# Patient Record
(demographics unavailable — no encounter records)

---

## 2017-01-26 NOTE — ER DOCUMENT REPORT
ED General





- General


Chief Complaint: Blood Pressure Problem


Stated Complaint: BACK PAIN


Mode of Arrival: Ambulatory


Notes: 


Patient is a pleasant 46-year-old female who presents with complaint of back 

pain.  She says that she reached for something shelf.  She felt a pull in her 

mid back.  Since then she's had intermittent spasming of the muscles of her mid 

back.  No lower back pain.  No chest pain.  No abdominal pain.  No weakness or 

numbness into the extremities.  No other injuries or complaints.  No loss of 

control of bowel function.  No urinary retention.


TRAVEL OUTSIDE OF THE [place] IN LAST 30 DAYS: No





- Related Data


Allergies/Adverse Reactions: 


 





codeine [Codeine] Allergy (Severe, Verified 12/08/16 11:00)


 severe N&V











Past Medical History





- General


Information source: Patient





- Social History


Smoking Status: Never Smoker


Chew tobacco use (# tins/day): No


Frequency of alcohol use: None


Drug Abuse: None


Family History: Reviewed & Not Pertinent


Patient has suicidal ideation: No


Patient has homicidal ideation: No





- Past Medical History


Cardiac Medical History: 


   Denies: Hx Coronary Artery Disease, Hx Heart Attack, Hx Hypertension


Pulmonary Medical History: 


   Denies: Hx Asthma, Hx Bronchitis, Hx COPD, Hx Pneumonia


Neurological Medical History: Denies: Hx Cerebrovascular Accident, Hx Seizures


Renal/ Medical History: Denies: Hx Peritoneal Dialysis


GI Medical History: Reports: Hx Gastroesophageal Reflux Disease


Musculoskeltal Medical History: Denies Hx Arthritis


Psychiatric Medical History: Reports: Hx Anxiety


Infectious Medical History: 


Past Surgical History: Reports: Hx Appendectomy, Hx Hysterectomy, Hx Tubal 

Ligation.  Denies: Hx Pacemaker





- Immunizations


Immunizations up to date: No


Hx Diphtheria, Pertussis, Tetanus Vaccination: No





Review of Systems





- Review of Systems


Notes: 


My Normal Review Basic





REVIEW OF SYSTEMS:


CONSTITUTIONAL :  Denies fever,  chills, or sweats.  Denies recent illness.


RESPIRATORY:  Denies cough, cold, or chest congestion.  Denies shortness of 

breath, difficulty breathing, or wheezing.


GASTROINTESTINAL:  Denies abdominal pain.  Denies nausea, vomiting, or 

diarrhea.  Denies constipation.  Last BM: 


MUSCULOSKELETAL: Back pain


SKIN:   Denies rash or skin lesions.


NEUROLOGICAL:  Denies altered mental status or loss of consciousness.  Denies 

headache.  Denies weakness or paralysis or loss of use of either side.  Denies 

problems with gait or speech.  Denies sensory or motor loss.


ALL OTHER SYSTEMS REVIEWED AND NEGATIVE.





Physical Exam





- Vital signs


Vitals: 


 











Temp Pulse Resp BP Pulse Ox


 


 97.9 F   99   18   159/88 H  100 


 


 01/26/17 21:36  01/26/17 21:36  01/26/17 21:36  01/26/17 21:36  01/26/17 21:36














- Notes


Notes: 


General Appearance: Well nourished, alert, cooperative, no acute distress, no 

obvious discomfort.


Vitals: reviewed, See vital signs table.


Head: no swelling or tenderness to the head


Eyes: PERRL, EOMI, Conjuctiva clear


Mouth: No decreasd moisture


Back: No reproducible tenderness to palpation of the lumbar spine.  Patient has 

tenderness to palpation over the left thoracic paraspinal musculature around 

the level of T5-T6.


Extremities: strength 5/5 in all extremities, good pulses in all extremities, 

no swelling or tenderness in the extremities, no edema.


Skin: warm, dry, appropriate color, no rash


Neuro: speech clear, oriented x 3, normal affect, responds appropriately to 

questions.  Patient is able move all extremities without difficulty.  Normal 

gait.  Normal distal sensation.





Course





- Vital Signs


Vital signs: 


 











Temp Pulse Resp BP Pulse Ox


 


 97.7 F   93   20   150/92 H  99 


 


 01/27/17 00:25  01/27/17 00:25  01/27/17 00:25  01/27/17 00:25  01/27/17 00:25














- Transfer of Care


Notes: 





01/27/17 05:35


Patient's findings are consistent with that of the midthoracic spinal 

musculature spasm.  I will prescribe Skelaxin.  I encouraged return to ER 

immediately if she has worsening pain, fevers, or feels unwell.  Patient agrees 

with plan and will be discharged home.





Dictation of this chart was performed using voice recognition software; 

therefore, there may be some unintended grammatical errors.





Discharge





- Discharge


Clinical Impression: 


 Back spasm





Strain of thoracic region


Qualifiers:


 Encounter type: initial encounter Qualified Code(s): S29.019A - Strain of 

muscle and tendon of unspecified wall of thorax, initial encounter





Condition: Good


Disposition: HOME, SELF-CARE


Additional Instructions: 


Please return to the ER if you have worsening pain, chest pain, or feel unwell. 

Your blood pressure was slightly elevated today. This is probably related to 

pain. Please make sure that you follow up with your doctor for reevaluation to 

make sure your blood pressure is improving. Please be sure to continue to walk 

and stay active, but do not do heavy lifting or over exert yourself.


Prescriptions: 


Metaxalone [Skelaxin 800 mg Tablet] 800 mg PO ASDIR PRN #20 tablet


 PRN Reason: 


Referrals: 


MARIA INES MOORE MD [Primary Care Provider] - Follow up in 3-5 days

## 2017-01-26 NOTE — ER DOCUMENT REPORT
ED Medical Screen (RME)





- General


Stated Complaint: BACK PAIN


Mode of Arrival: Ambulatory


Information source: Patient


Notes: 


She presents to the emergency department with complaints of back spasm since 

Sunday.  She reports she was feeling bad today and took her blood pressure and 

it was 170/101.  Patient reports she took Aleve a couple days ago for her back 

pain.  Denies pain with void denies fever vomiting diarrhea.  Nuys history of 

cardiac disease denies chest pain.











I have greeted and performed a rapid initial assessment of this patient.  A 

comprehensive ED assessment and evaluation of the patient, analysis of test 

results and completion of the medical decision making process will be conducted 

by additional ED providers.














TRAVEL OUTSIDE OF THE [place] IN LAST 30 DAYS: No





- Related Data


Allergies/Adverse Reactions: 


 





codeine [Codeine] Allergy (Severe, Verified 12/08/16 11:00)


 severe N&V











Past Medical History





- Past Medical History


Cardiac Medical History: 


   Denies: Hx Coronary Artery Disease, Hx Heart Attack, Hx Hypertension


Pulmonary Medical History: 


   Denies: Hx Asthma, Hx Bronchitis, Hx COPD, Hx Pneumonia


Neurological Medical History: Denies: Hx Cerebrovascular Accident, Hx Seizures


GI Medical History: Reports: Hx Gastroesophageal Reflux Disease


Musculoskeltal Medical History: Denies Hx Arthritis


Psychiatric Medical History: Reports: Hx Anxiety


Infectious Medical History: 


Past Surgical History: Reports: Hx Appendectomy, Hx Hysterectomy, Hx Tubal 

Ligation.  Denies: Hx Pacemaker





- Immunizations


Immunizations up to date: No


Hx Diphtheria, Pertussis, Tetanus Vaccination: No





Physical Exam





- Vital signs


Vitals: 





 











Temp Pulse Resp BP Pulse Ox


 


 97.9 F   99   18   159/88 H  100 


 


 01/26/17 21:36  01/26/17 21:36  01/26/17 21:36  01/26/17 21:36  01/26/17 21:36














Course





- Vital Signs


Vital signs: 





 











Temp Pulse Resp BP Pulse Ox


 


 97.9 F   99   18   159/88 H  100 


 


 01/26/17 21:36  01/26/17 21:36  01/26/17 21:36  01/26/17 21:36  01/26/17 21:36

## 2017-09-15 NOTE — WOMENS IMAGING REPORT
EXAM DESCRIPTION:  3D SCREENING MAMMO BILAT



COMPLETED DATE/TIME:  9/15/2017 9:05 am



REASON FOR STUDY:  SCREENING MAMMO Z12.31  ENCNTR SCREEN MAMMOGRAM FOR MALIGNANT NEOPLASM OF [place]



COMPARISON:  Annual priors dating back to June 2008.



TECHNIQUE:  Standard craniocaudal and mediolateral oblique views of each breast recorded using digita
l acquisition and breast tomosynthesis.



LIMITATIONS:  None.



FINDINGS:  No masses, calcifications or architectural distortion. No areas of suspicion.

Read with the assistance of CAD.

.[facility] - R2 Cenova Version 1.3

.[facility] - R2 Cenova Version 1.3

.[facility] - R2 Cenova Version 2.4

.[facility] - R2 Cenova Version 2.4

.[facility] - R2  Version 9.2



IMPRESSION:  NORMAL MAMMOGRAM.  BIRADS 1.



BREAST DENSITY:  d. The breasts are extremely dense, which lowers the sensitivity of mammography.



BIRAD:  1 NEGATIVE



RECOMMENDATION:  ROUTINE SCREENING



COMMENT:  The patient has been notified of the results by letter per SA requirements. Additional no
tification policies are in place for contacting patient with suspicious or incomplete findings.

Quality ID #[id number]: The American College of Radiology recommends an annual screening mammogram for women
 aged 40 years or over. This facility utilizes a reminder system to ensure that all patients receive 
reminder letters, and/or direct phone calls for appointments. This includes reminders for routine scr
eening mammograms, diagnostic mammograms, or other Breast Imaging Interventions when appropriate.  Th
is patient will be placed in the appropriate reminder system.

The American College of Radiology (ACR) has developed recommendations for screening MRI of the breast
s in certain patient populations, to be used in conjunction with mammography.  Breast MRI surveillanc
e may be appropriate for women with more than 20% lifetime risk of developing breast cancer  as deter
mined by genetic testing, significant family history of the disease, or history of mantle radiation f
or Hodgkins Disease.  ACR Practice Guidelines 2008.

[employer]



PQRS 6045F: Fluoroscopic imaging is not utilized for breast tomosynthesis.



TECHNICAL DOCUMENTATION:  FINDING NUMBER: (1)

ASSESSMENT:  (1)

JOB ID:  [id number]

 2011 [employer]- All Rights Reserved

## 2017-09-22 NOTE — OPERATIVE REPORT
Operative Report


DATE OF SURGERY: 09/22/17


Operative Report: 





The risks benefits and alternatives of the procedure explained to the patient 

in detail and informed consent is obtained .A GIF Olympus video scope was 

inserted into the patient's mouth and hypopharynx, the esophagus is identified 

intubated and insufflated, the scope was then advanced through the esophagus 

stomach and duodenum, retroflexion maneuver is done, the esophagus stomach and 

first and second portions of the duodenum examined


PREOPERATIVE DIAGNOSIS: Gastroesophageal reflux disease, dyspepsia


POSTOPERATIVE DIAGNOSIS: Hiatal hernia.  Gastritis status post biopsy rule out 

Helicobacter pylori


OPERATION: EGD with biopsy


SURGEON: DEJUAN HAN


ANESTHESIA: Moderate Sedation - 4 mg of Versed, 75 mcg of fentanyl.  Conscious 

sedation monitoring time 30 minutes.


TISSUE REMOVED OR ALTERED: Gastric mucosal specimen obtained.  As described 

above.


COMPLICATIONS: 





None.


ESTIMATED BLOOD LOSS: None.


INTRAOPERATIVE FINDINGS: As described above.


PROCEDURE: 





Patient tolerated procedure well.


No immediate postprocedure complications are noted.


Patient discharged in good condition.


Discharge date 9/22/2017.


Discharge diet: Regular.


Discharge activity: Regular.


2-3 week follow-up to discuss findings.


Patient is instructed to call the office or proceed to the emergency room 

should there be any further problems or questions.


We will wait on pathology.